# Patient Record
Sex: MALE | Race: WHITE | NOT HISPANIC OR LATINO | Employment: UNEMPLOYED | URBAN - METROPOLITAN AREA
[De-identification: names, ages, dates, MRNs, and addresses within clinical notes are randomized per-mention and may not be internally consistent; named-entity substitution may affect disease eponyms.]

---

## 2018-01-05 ENCOUNTER — HOSPITAL ENCOUNTER (EMERGENCY)
Facility: HOSPITAL | Age: 18
Discharge: HOME | End: 2018-01-05
Attending: EMERGENCY MEDICINE | Admitting: EMERGENCY MEDICINE
Payer: COMMERCIAL

## 2018-01-05 VITALS
RESPIRATION RATE: 18 BRPM | SYSTOLIC BLOOD PRESSURE: 144 MMHG | WEIGHT: 140 LBS | HEART RATE: 100 BPM | DIASTOLIC BLOOD PRESSURE: 73 MMHG | TEMPERATURE: 98.4 F | OXYGEN SATURATION: 96 %

## 2018-01-05 DIAGNOSIS — R46.89 ARGUMENTATIVE BEHAVIOR: Primary | ICD-10-CM

## 2018-01-05 LAB
AMPHETAMINES SERPL QL SCN: POSITIVE
BARBITURATES UR QL: NEGATIVE
BENZODIAZ UR QL: NEGATIVE
COCAINE UR QL: NEGATIVE
ETHANOL EXG-MCNC: 0 MG/DL
METHADONE UR QL: NEGATIVE
OPIATES UR QL SCN: NEGATIVE
PCP UR QL: NEGATIVE
THC UR QL: POSITIVE

## 2018-01-05 PROCEDURE — 80307 DRUG TEST PRSMV CHEM ANLYZR: CPT | Performed by: EMERGENCY MEDICINE

## 2018-01-05 PROCEDURE — 99284 EMERGENCY DEPT VISIT MOD MDM: CPT

## 2018-01-05 PROCEDURE — 82075 ASSAY OF BREATH ETHANOL: CPT | Performed by: EMERGENCY MEDICINE
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
## 2018-01-05 NOTE — DISCHARGE INSTRUCTIONS
Continue to attend your weekly sessions at [facility]  Continue to take your medications as prescribed  Return to the ED if you develop thoughts of hurting yourself or others  Anxiety   WHAT YOU NEED TO KNOW:   Anxiety is a condition that causes you to feel extremely worried or nervous  The feelings are so strong that they can cause problems with your daily activities or sleep  Anxiety may be triggered by something you fear, or it may happen without a cause  Family or work stress, smoking, caffeine, and alcohol can increase your risk for anxiety  Certain medicines or health conditions can also increase your risk  Anxiety can become a long-term condition if it is not managed or treated  DISCHARGE INSTRUCTIONS:   Call 911 if:   · You have chest pain, tightness, or heaviness that may spread to your shoulders, arms, jaw, neck, or back  · You feel like hurting yourself or someone else  Contact your healthcare provider if:   · Your symptoms get worse or do not get better with treatment  · Your anxiety keeps you from doing your regular daily activities  · You have new symptoms since your last visit  · You have questions or concerns about your condition or care  Medicines:   · Medicines  may be given to help you feel more calm and relaxed, and decrease your symptoms  · Take your medicine as directed  Contact your healthcare provider if you think your medicine is not helping or if you have side effects  Tell him of her if you are allergic to any medicine  Keep a list of the medicines, vitamins, and herbs you take  Include the amounts, and when and why you take them  Bring the list or the pill bottles to follow-up visits  Carry your medicine list with you in case of an emergency  Follow up with your healthcare provider within 2 weeks or as directed:  Write down your questions so you remember to ask them during your visits  Manage anxiety:   · Talk to someone about your anxiety    Your healthcare provider may suggest counseling  Cognitive behavioral therapy can help you understand and change how you react to events that trigger your symptoms  You might feel more comfortable talking with a friend or family member about your anxiety  Choose someone you know will be supportive and encouraging  · Find ways to relax  Activities such as exercise, meditation, or listening to music can help you relax  Spend time with friends, or do things you enjoy  · Practice deep breathing  Deep breathing can help you relax when you feel anxious  Focus on taking slow, deep breaths several times a day, or during an anxiety attack  Breathe in through your nose and out through your mouth  · Create a regular sleep routine  Regular sleep can help you feel calmer during the day  Go to sleep and wake up at the same times every day  Do not watch television or use the computer right before bed  Your room should be comfortable, dark, and quiet  · Eat a variety of healthy foods  Healthy foods include fruits, vegetables, low-fat dairy products, lean meats, fish, whole-grain breads, and cooked beans  Healthy foods can help you feel less anxious and have more energy  · Exercise regularly  Exercise can increase your energy level  Exercise may also lift your mood and help you sleep better  Your healthcare provider can help you create an exercise plan  · Do not smoke  Nicotine and other chemicals in cigarettes and cigars can increase anxiety  Ask your healthcare provider for information if you currently smoke and need help to quit  E-cigarettes or smokeless tobacco still contain nicotine  Talk to your healthcare provider before you use these products  · Do not have caffeine  Caffeine can make your symptoms worse  Do not have foods or drinks that are meant to increase your energy level  · Limit or do not drink alcohol  Ask your healthcare provider if alcohol is safe for you   You may not be able to drink alcohol if you take certain anxiety or depression medicines  Limit alcohol to 1 drink per day if you are a woman  Limit alcohol to 2 drinks per day if you are a man  A drink of alcohol is 12 ounces of beer, 5 ounces of wine, or 1½ ounces of liquor  · Do not use drugs  Drugs can make your anxiety worse  It can also make anxiety hard to manage  Talk to your healthcare provider if you use drugs and want help to quit  © 2017 [employer] [employer] Information is for End User's use only and may not be sold, redistributed or otherwise used for commercial purposes  All illustrations and images included in CareNotes® are the copyrighted property of A D A M , Inc  or Matthew Munoz  The above information is an  only  It is not intended as medical advice for individual conditions or treatments  Talk to your doctor, nurse or pharmacist before following any medical regimen to see if it is safe and effective for you
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
## 2018-08-14 ENCOUNTER — OFFICE VISIT (OUTPATIENT)
Dept: URGENT CARE | Facility: CLINIC | Age: 18
End: 2018-08-14
Payer: COMMERCIAL

## 2018-08-14 VITALS
SYSTOLIC BLOOD PRESSURE: 112 MMHG | HEART RATE: 64 BPM | DIASTOLIC BLOOD PRESSURE: 68 MMHG | RESPIRATION RATE: 16 BRPM | TEMPERATURE: 98.7 F | WEIGHT: 145 LBS | OXYGEN SATURATION: 100 % | BODY MASS INDEX: 21.98 KG/M2 | HEIGHT: 68 IN

## 2018-08-14 DIAGNOSIS — H60.502 ACUTE OTITIS EXTERNA OF LEFT EAR, UNSPECIFIED TYPE: Primary | ICD-10-CM

## 2018-08-14 PROCEDURE — 99213 OFFICE O/P EST LOW 20 MIN: CPT

## 2018-08-14 RX ORDER — OFLOXACIN 3 MG/ML
10 SOLUTION AURICULAR (OTIC) 2 TIMES DAILY
Qty: 5 ML | Refills: 0 | Status: SHIPPED | OUTPATIENT
Start: 2018-08-14 | End: 2018-08-21

## 2018-08-14 RX ORDER — FLUOXETINE HYDROCHLORIDE 40 MG/1
CAPSULE ORAL
COMMUNITY

## 2018-08-14 RX ORDER — DEXTROAMPHETAMINE SACCHARATE, AMPHETAMINE ASPARTATE MONOHYDRATE, DEXTROAMPHETAMINE SULFATE AND AMPHETAMINE SULFATE 3.75; 3.75; 3.75; 3.75 MG/1; MG/1; MG/1; MG/1
1 CAPSULE, EXTENDED RELEASE ORAL EVERY 24 HOURS
COMMUNITY
Start: 2015-10-06

## 2018-08-14 RX ORDER — CHLORPROMAZINE HYDROCHLORIDE 10 MG/1
1 TABLET, FILM COATED ORAL
COMMUNITY

## 2018-08-14 RX ORDER — TRAZODONE HYDROCHLORIDE 50 MG/1
50-100 TABLET ORAL DAILY
COMMUNITY
Start: 2016-09-06

## 2018-08-14 RX ORDER — THIORIDAZINE HYDROCHLORIDE 10 MG/1
10 TABLET, FILM COATED ORAL
COMMUNITY

## 2018-08-14 NOTE — PROGRESS NOTES
[facility]        NAME: Adelaide Gupta is a 19 y o  male  : 2000    MRN: [id number]  DATE: 2018  TIME: 3:28 PM    Assessment and Plan   Acute otitis externa of left ear, unspecified type [H60 502]  1  Acute otitis externa of left ear, unspecified type  ofloxacin (FLOXIN) 0 3 % otic solution         Patient Instructions     Otitis externa  floxin otic  Follow up with PCP in 3-5 days  Proceed to  ER if symptoms worsen  Chief Complaint     Chief Complaint   Patient presents with    Earache     C/O pain in left ear and difficulty hearing from ear x 1-2 days  History of Present Illness       20 y/o male c/o pain to left ear x 2 days  Patient denies fever, chills, n/v         Review of Systems   Review of Systems   Constitutional: Negative  HENT: Positive for ear pain  Negative for hearing loss, mouth sores, nosebleeds, postnasal drip, sinus pain, sinus pressure, sneezing, sore throat and tinnitus  Respiratory: Negative  Cardiovascular: Negative            Current Medications       Current Outpatient Prescriptions:     amphetamine-dextroamphetamine (ADDERALL XR, 15MG,) 15 MG 24 hr capsule, Take 1 capsule by mouth every 24 hours, Disp: , Rfl:     traZODone (DESYREL) 50 mg tablet, Take  mg by mouth daily, Disp: , Rfl:     chlorproMAZINE (THORAZINE) 10 mg tablet, Take 1 tablet by mouth, Disp: , Rfl:     FLUoxetine (PROZAC) 40 MG capsule, Take by mouth, Disp: , Rfl:     ofloxacin (FLOXIN) 0 3 % otic solution, Administer 10 drops into the left ear 2 (two) times a day for 7 days, Disp: 5 mL, Rfl: 0    thioridazine (MELLARIL) 10 mg tablet, Take 10 mg by mouth, Disp: , Rfl:     Current Allergies     Allergies as of 2018    (No Known Allergies)            The following portions of the patient's history were reviewed and updated as appropriate: allergies, current medications, past family history, past medical history, past social history, past surgical history and problem list      Past Medical History:   Diagnosis Date    ADHD (attention deficit hyperactivity disorder)     Allergic rhinitis     Anger reaction     Depression        History reviewed  No pertinent surgical history  No family history on file  Medications have been verified  Objective   /68   Pulse 64   Temp 98 7 °F (37 1 °C) (Tympanic)   Resp 16   Ht 5' 8" (1 727 m)   Wt 65 8 kg (145 lb)   SpO2 100%   BMI 22 05 kg/m²        Physical Exam     Physical Exam   Constitutional: He is oriented to person, place, and time  He appears well-developed and well-nourished  No distress  HENT:   Head: Normocephalic and atraumatic  Right Ear: Hearing, tympanic membrane, external ear and ear canal normal    Left Ear: Hearing normal  There is swelling and tenderness  Tympanic membrane is not injected, not scarred, not perforated, not erythematous and not retracted  Mouth/Throat: Uvula is midline, oropharynx is clear and moist and mucous membranes are normal    Eyes: Conjunctivae and EOM are normal  Pupils are equal, round, and reactive to light  Neck: Normal range of motion  Neck supple  Cardiovascular: Normal rate, regular rhythm, normal heart sounds and intact distal pulses  Pulmonary/Chest: Effort normal and breath sounds normal    Lymphadenopathy:     He has cervical adenopathy  Neurological: He is alert and oriented to person, place, and time  Skin: He is not diaphoretic

## 2018-08-14 NOTE — PATIENT INSTRUCTIONS
Otitis externa  floxin otic  Follow up with PCP in 3-5 days  Proceed to  ER if symptoms worsen  Otitis Externa   WHAT YOU NEED TO KNOW:   Otitis externa, or swimmer's ear, is an infection in the outer ear canal  This canal goes from the outside of the ear to the eardrum  DISCHARGE INSTRUCTIONS:   Return to the emergency department if:   · You have severe ear pain  · You are suddenly unable to hear at all  · You have new swelling in your face, behind your ears, or in your neck  · You suddenly cannot move part of your face  · Your face suddenly feels numb  Contact your healthcare provider if:   · You have a fever  · Your signs and symptoms do not get better after 2 days of treatment  · Your signs and symptoms go away for a time, but then come back  · You have questions or concerns about your condition or care  Medicines:   · NSAIDs , such as ibuprofen, help decrease swelling, pain, and fever  This medicine is available with or without a doctor's order  NSAIDs can cause stomach bleeding or kidney problems in certain people  If you take blood thinner medicine, always ask if NSAIDs are safe for you  Always read the medicine label and follow directions  Do not give these medicines to children under 10months of age without direction from your child's healthcare provider  · Acetaminophen  decreases pain and fever  It is available without a doctor's order  Ask how much to take and how often to take it  Follow directions  Acetaminophen can cause liver damage if not taken correctly  · Ear drops  that contain an antibiotic may be given  The antibiotic helps treat a bacterial infection  You may also be given steroid medicine  The steroid helps decrease redness, swelling, and pain  · Take your medicine as directed  Contact your healthcare provider if you think your medicine is not helping or if you have side effects  Tell him or her if you are allergic to any medicine   Keep a list of the medicines, vitamins, and herbs you take  Include the amounts, and when and why you take them  Bring the list or the pill bottles to follow-up visits  Carry your medicine list with you in case of an emergency  Follow up with your healthcare provider as directed:  Write down your questions so you remember to ask them during your visits  How to use eardrops:   · Lie down on your side with your infected ear facing up  · Carefully drip the correct number of eardrops into your ear  Have another person help you if possible  · Gently move the outside part of your ear back and forth to help the medicine reach your ear canal      · Stay lying down in the same position (with your ear facing up) for 3 to 5 minutes  Prevent otitis externa:   · Do not put cotton swabs or foreign objects in your ears  · Wrap a clean moist washcloth around your finger, and use it to clean your outer ear and remove extra ear wax  · Use ear plugs when you swim  Dry your outer ears completely after you swim or bathe  © 2017 [address] [facility] Information is for End User's use only and may not be sold, redistributed or otherwise used for commercial purposes  All illustrations and images included in CareNotes® are the copyrighted property of A D A M , Inc  or Matthew Munoz  The above information is an  only  It is not intended as medical advice for individual conditions or treatments  Talk to your doctor, nurse or pharmacist before following any medical regimen to see if it is safe and effective for you

## 2024-02-26 ENCOUNTER — HOSPITAL ENCOUNTER (EMERGENCY)
Facility: HOSPITAL | Age: 24
Discharge: HOME | End: 2024-02-26
Payer: COMMERCIAL

## 2024-02-26 VITALS
DIASTOLIC BLOOD PRESSURE: 81 MMHG | SYSTOLIC BLOOD PRESSURE: 130 MMHG | RESPIRATION RATE: 16 BRPM | WEIGHT: 145 LBS | HEIGHT: 68 IN | TEMPERATURE: 98.1 F | HEART RATE: 82 BPM | OXYGEN SATURATION: 100 % | BODY MASS INDEX: 21.98 KG/M2

## 2024-02-26 PROCEDURE — 4500999001 HC ED NO CHARGE

## 2024-02-26 ASSESSMENT — LIFESTYLE VARIABLES
HAVE YOU EVER FELT YOU SHOULD CUT DOWN ON YOUR DRINKING: NO
HAVE PEOPLE ANNOYED YOU BY CRITICIZING YOUR DRINKING: NO
EVER FELT BAD OR GUILTY ABOUT YOUR DRINKING: NO
EVER HAD A DRINK FIRST THING IN THE MORNING TO STEADY YOUR NERVES TO GET RID OF A HANGOVER: NO

## 2024-02-26 ASSESSMENT — COLUMBIA-SUICIDE SEVERITY RATING SCALE - C-SSRS
1. IN THE PAST MONTH, HAVE YOU WISHED YOU WERE DEAD OR WISHED YOU COULD GO TO SLEEP AND NOT WAKE UP?: NO
6. HAVE YOU EVER DONE ANYTHING, STARTED TO DO ANYTHING, OR PREPARED TO DO ANYTHING TO END YOUR LIFE?: NO
2. HAVE YOU ACTUALLY HAD ANY THOUGHTS OF KILLING YOURSELF?: NO

## 2024-02-26 ASSESSMENT — PAIN SCALES - GENERAL: PAINLEVEL_OUTOF10: 0 - NO PAIN

## 2024-02-26 ASSESSMENT — PAIN - FUNCTIONAL ASSESSMENT: PAIN_FUNCTIONAL_ASSESSMENT: 0-10

## 2024-02-26 NOTE — ED TRIAGE NOTES
Flu like sx, aches chills and a cough, possible fever last 2 days. No issues with gi. No cough or fevers today.

## 2024-09-04 ENCOUNTER — OFFICE VISIT (OUTPATIENT)
Dept: PRIMARY CARE | Facility: CLINIC | Age: 24
End: 2024-09-04
Payer: COMMERCIAL

## 2024-09-04 VITALS
DIASTOLIC BLOOD PRESSURE: 78 MMHG | WEIGHT: 141.2 LBS | OXYGEN SATURATION: 98 % | SYSTOLIC BLOOD PRESSURE: 122 MMHG | BODY MASS INDEX: 21.47 KG/M2 | HEART RATE: 76 BPM

## 2024-09-04 DIAGNOSIS — Z00.00 PHYSICAL EXAM: Primary | ICD-10-CM

## 2024-09-04 DIAGNOSIS — H00.14 CHALAZION LEFT UPPER EYELID: ICD-10-CM

## 2024-09-04 DIAGNOSIS — M25.562 LEFT KNEE PAIN, UNSPECIFIED CHRONICITY: ICD-10-CM

## 2024-09-04 PROCEDURE — 99385 PREV VISIT NEW AGE 18-39: CPT | Performed by: PHYSICIAN ASSISTANT

## 2024-09-04 PROCEDURE — 1036F TOBACCO NON-USER: CPT | Performed by: PHYSICIAN ASSISTANT

## 2024-09-04 ASSESSMENT — PATIENT HEALTH QUESTIONNAIRE - PHQ9
SUM OF ALL RESPONSES TO PHQ9 QUESTIONS 1 AND 2: 0
2. FEELING DOWN, DEPRESSED OR HOPELESS: NOT AT ALL
1. LITTLE INTEREST OR PLEASURE IN DOING THINGS: NOT AT ALL

## 2024-09-04 ASSESSMENT — COLUMBIA-SUICIDE SEVERITY RATING SCALE - C-SSRS
6. HAVE YOU EVER DONE ANYTHING, STARTED TO DO ANYTHING, OR PREPARED TO DO ANYTHING TO END YOUR LIFE?: NO
2. HAVE YOU ACTUALLY HAD ANY THOUGHTS OF KILLING YOURSELF?: NO
1. IN THE PAST MONTH, HAVE YOU WISHED YOU WERE DEAD OR WISHED YOU COULD GO TO SLEEP AND NOT WAKE UP?: NO

## 2024-09-04 ASSESSMENT — PAIN SCALES - GENERAL: PAINLEVEL: 0-NO PAIN

## 2024-09-04 NOTE — PATIENT INSTRUCTIONS
Recommend strengthening to legs - quads and hamstrings.   If eye lid bumps do improve or worsen will recommend ophthalmology visit.

## 2024-09-04 NOTE — PROGRESS NOTES
Subjective   Patient ID: Elbert Nogueira is a 24 y.o. male who presents for Annual Exam (Pt is here for physical / nr) and Knee Pain (Pt would like to discuss his knee pain / nr).    23 y/o male seen for RPE>   Has a few concerns -   1) Bumps to upper eyelids which were irritating and uncomfortable.   2) Also with B/L knee pain L>R. States 6 months ago he had an injury with twisting his knee. Was using a knee brace at work for a few weeks. Feels unsteady and lax at times.     Knee Pain          Review of Systems   All other systems reviewed and are negative.      Objective   /78   Pulse 76   Wt 64 kg (141 lb 3.2 oz)   SpO2 98%   BMI 21.47 kg/m²     Physical Exam  Constitutional:       General: He is not in acute distress.     Appearance: Normal appearance.   HENT:      Right Ear: Tympanic membrane and ear canal normal.      Left Ear: Tympanic membrane and ear canal normal.      Mouth/Throat:      Pharynx: Oropharynx is clear. No posterior oropharyngeal erythema.   Eyes:      Extraocular Movements: Extraocular movements intact.      Pupils: Pupils are equal, round, and reactive to light.   Cardiovascular:      Rate and Rhythm: Normal rate and regular rhythm.      Heart sounds: Normal heart sounds.   Pulmonary:      Breath sounds: Normal breath sounds.   Abdominal:      General: Bowel sounds are normal.      Palpations: Abdomen is soft.      Tenderness: There is no abdominal tenderness.   Musculoskeletal:         General: Normal range of motion.      Cervical back: Neck supple.   Skin:     Findings: No rash.   Neurological:      Mental Status: He is alert.         Assessment/Plan   Diagnoses and all orders for this visit:  Physical exam  -     Comprehensive metabolic panel; Future  -     Lipid panel; Future  -     CBC and Auto Differential; Future  -     TSH with reflex to Free T4 if abnormal; Future  Left knee pain, unspecified chronicity  Chalazion left upper eyelid